# Patient Record
Sex: MALE | Race: WHITE
[De-identification: names, ages, dates, MRNs, and addresses within clinical notes are randomized per-mention and may not be internally consistent; named-entity substitution may affect disease eponyms.]

---

## 2018-05-30 ENCOUNTER — HOSPITAL ENCOUNTER (EMERGENCY)
Dept: HOSPITAL 17 - NEPD | Age: 18
LOS: 1 days | Discharge: HOME | End: 2018-05-31
Payer: COMMERCIAL

## 2018-05-30 VITALS — BODY MASS INDEX: 24.06 KG/M2 | HEIGHT: 68 IN | WEIGHT: 158.73 LBS

## 2018-05-30 VITALS
RESPIRATION RATE: 18 BRPM | OXYGEN SATURATION: 97 % | DIASTOLIC BLOOD PRESSURE: 81 MMHG | TEMPERATURE: 98.6 F | HEART RATE: 136 BPM | SYSTOLIC BLOOD PRESSURE: 140 MMHG

## 2018-05-30 DIAGNOSIS — F17.290: ICD-10-CM

## 2018-05-30 DIAGNOSIS — F10.14: Primary | ICD-10-CM

## 2018-05-30 DIAGNOSIS — F10.129: ICD-10-CM

## 2018-05-30 DIAGNOSIS — F12.90: ICD-10-CM
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
## 2018-05-30 PROCEDURE — 99283 EMERGENCY DEPT VISIT LOW MDM: CPT

## 2018-05-30 PROCEDURE — 85025 COMPLETE CBC W/AUTO DIFF WBC: CPT

## 2018-05-30 PROCEDURE — 84443 ASSAY THYROID STIM HORMONE: CPT

## 2018-05-30 PROCEDURE — 80053 COMPREHEN METABOLIC PANEL: CPT

## 2018-05-30 PROCEDURE — 80307 DRUG TEST PRSMV CHEM ANLYZR: CPT

## 2018-05-31 VITALS
OXYGEN SATURATION: 99 % | DIASTOLIC BLOOD PRESSURE: 74 MMHG | HEART RATE: 92 BPM | SYSTOLIC BLOOD PRESSURE: 118 MMHG | TEMPERATURE: 98.6 F | RESPIRATION RATE: 18 BRPM

## 2018-05-31 VITALS — HEART RATE: 87 BPM | OXYGEN SATURATION: 98 % | RESPIRATION RATE: 16 BRPM

## 2018-05-31 LAB
ALBUMIN SERPL-MCNC: 5 GM/DL (ref 3.4–5)
ALP SERPL-CCNC: 86 U/L (ref 45–117)
ALT SERPL-CCNC: 23 U/L (ref 9–52)
APAP SERPL-MCNC: (no result) MCG/ML (ref 10–30)
AST SERPL-CCNC: 30 U/L (ref 15–39)
BASOPHILS # BLD AUTO: 0 TH/MM3 (ref 0–0.2)
BASOPHILS NFR BLD: 0.3 % (ref 0–2)
BILIRUB SERPL-MCNC: 1.7 MG/DL (ref 0.2–1)
BUN SERPL-MCNC: 11 MG/DL (ref 7–18)
CALCIUM SERPL-MCNC: 9.1 MG/DL (ref 8.5–10.1)
CHLORIDE SERPL-SCNC: 108 MEQ/L (ref 98–107)
CREAT SERPL-MCNC: 1.01 MG/DL (ref 0.6–1.3)
EOSINOPHIL # BLD: 0.2 TH/MM3 (ref 0–0.4)
EOSINOPHIL NFR BLD: 2.1 % (ref 0–4)
ERYTHROCYTE [DISTWIDTH] IN BLOOD BY AUTOMATED COUNT: 14 % (ref 11.6–17.2)
GFR SERPLBLD BASED ON 1.73 SQ M-ARVRAT: 95 ML/MIN (ref 89–?)
GLUCOSE SERPL-MCNC: 96 MG/DL (ref 74–106)
HCO3 BLD-SCNC: 23 MEQ/L (ref 21–32)
HCT VFR BLD CALC: 47.8 % (ref 39–51)
HGB BLD-MCNC: 16.4 GM/DL (ref 13–17)
LYMPHOCYTES # BLD AUTO: 2.5 TH/MM3 (ref 1–4.8)
LYMPHOCYTES NFR BLD AUTO: 23 % (ref 9–44)
MCH RBC QN AUTO: 29.5 PG (ref 27–34)
MCHC RBC AUTO-ENTMCNC: 34.2 % (ref 32–36)
MCV RBC AUTO: 86.3 FL (ref 80–100)
MONOCYTE #: 0.8 TH/MM3 (ref 0–0.9)
MONOCYTES NFR BLD: 7.4 % (ref 0–8)
NEUTROPHILS # BLD AUTO: 7.3 TH/MM3 (ref 1.8–7.7)
NEUTROPHILS NFR BLD AUTO: 67.2 % (ref 16–70)
PLATELET # BLD: 228 TH/MM3 (ref 150–450)
PMV BLD AUTO: 8.8 FL (ref 7–11)
PROT SERPL-MCNC: 9.1 GM/DL (ref 6.4–8.2)
RBC # BLD AUTO: 5.54 MIL/MM3 (ref 4.5–5.9)
SODIUM SERPL-SCNC: 143 MEQ/L (ref 136–145)
WBC # BLD AUTO: 10.9 TH/MM3 (ref 4–11)

## 2018-05-31 NOTE — PD
HPI


Chief Complaint:  Psychiatric Symptoms


Time Seen by Provider:  00:05


Travel History


International Travel<30 days:  No


Contact w/Intl Traveler<30days:  No


Traveled to known affect area:  No





History of Present Illness


HPI


Patient is a 17-year-old male presented to the emergency department for 

psychiatric evaluation under Dominguez act.  Per the Dominguez act report patient 

allegedly had a knife to his throat attempting to cut himself after he had a 

verbal altercation with his girlfriend.  Per her report to the  

patient was threatening to cut his throat with a knife.  Patient states he did 

have a knife to his throat but did not cut himself.  Patient states that the 

marks on his neck are from scratches that his girlfriend caused.  Patient 

denies any psychiatric history, he denies any previous suicide attempt.  

Patient has no physical pain at this time.





[facility]


Past Medical History


Medical History:  Denies Significant Hx


Tetanus Vaccination:  Unknown





Past Surgical History


Surgical History:  No Previous Surgery





Social History


Alcohol Use:  No


Tobacco Use:  Yes (E-Cigarette)


Substance Use:  Yes (Marijuana)





Allergies-Medications


(Allergen,Severity, Reaction):  


Coded Allergies:  


     No Known Allergies (Verified  Allergy, Mild, 5/30/18)


Reported Meds & Prescriptions





Reported Meds & Active Scripts


Active


No Active Prescriptions or Reported Medications    








Review of Systems


Except as stated in HPI:  all other systems reviewed are Neg


Skin:  Positive Other (Abrasions)


Psychiatric:  Positive: Suicidal Ideations





Physical Exam


Narrative


GENERAL: Thin, well-developed, alert male.  Presenting in no acute distress.


SKIN: Warm and dry.  Superficial abrasions to neck and right upper arm and left 

forearm.


HEAD: Atraumatic. Normocephalic. 


EYES: Pupils equal and round. No scleral icterus. No injection or drainage. 


ENT: No nasal bleeding or discharge.  Mucous membranes pink and moist.


NECK: Trachea midline. No JVD. 


CARDIOVASCULAR: Regular rate and rhythm.  


RESPIRATORY: No accessory muscle use. Clear to auscultation. Breath sounds 

equal bilaterally. 


GASTROINTESTINAL: Abdomen soft, non-tender, nondistended. Hepatic and splenic 

margins not palpable. 


MUSCULOSKELETAL: Extremities without clubbing, cyanosis, or edema. No obvious 

deformities. 


NEUROLOGICAL: Awake and alert. No obvious cranial nerve deficits.  Motor 

grossly within normal limits. Five out of 5 muscle strength in the arms and 

legs.  Normal speech.


PSYCHIATRIC: Appropriate mood and affect; insight and judgment normal.





Data


Data


Last Documented VS





Vital Signs








  Date Time  Temp Pulse Resp B/P (MAP) Pulse Ox O2 Delivery O2 Flow Rate FiO2


 


5/30/18 23:54 98.6 136 18 140/81 (100) 97   








Orders





 Orders


Complete Blood Count With Diff (5/31/18 00:05)


Comprehensive Metabolic Panel (5/31/18 00:05)


Thyroid Stimulating Hormone (5/31/18 00:05)


Psych Screen (5/31/18 00:05)


Drug Screen, Random Urine (5/31/18 00:05)


Alcohol (Ethanol) (5/31/18 00:05)


Salicylates (Aspirin) (5/31/18 00:05)


Tylenol (Acetaminophen) (5/31/18 00:05)





Labs





Laboratory Tests








Test


  5/31/18


00:00


 


White Blood Count 10.9 TH/MM3 


 


Red Blood Count 5.54 MIL/MM3 


 


Hemoglobin 16.4 GM/DL 


 


Hematocrit 47.8 % 


 


Mean Corpuscular Volume 86.3 FL 


 


Mean Corpuscular Hemoglobin 29.5 PG 


 


Mean Corpuscular Hemoglobin


Concent 34.2 % 


 


 


Red Cell Distribution Width 14.0 % 


 


Platelet Count 228 TH/MM3 


 


Mean Platelet Volume 8.8 FL 


 


Neutrophils (%) (Auto) 67.2 % 


 


Lymphocytes (%) (Auto) 23.0 % 


 


Monocytes (%) (Auto) 7.4 % 


 


Eosinophils (%) (Auto) 2.1 % 


 


Basophils (%) (Auto) 0.3 % 


 


Neutrophils # (Auto) 7.3 TH/MM3 


 


Lymphocytes # (Auto) 2.5 TH/MM3 


 


Monocytes # (Auto) 0.8 TH/MM3 


 


Eosinophils # (Auto) 0.2 TH/MM3 


 


Basophils # (Auto) 0.0 TH/MM3 


 


CBC Comment DIFF FINAL 


 


Differential Comment  


 


Salicylates Level


  LESS THAN 1.7


MG/DL











MDM


Medical Decision Making


Medical Screen Exam Complete:  Yes


Emergency Medical Condition:  Yes


Interpretation(s)





Vital Signs








  Date Time  Temp Pulse Resp B/P (MAP) Pulse Ox O2 Delivery O2 Flow Rate FiO2


 


5/30/18 23:54 98.6 136 18 140/81 (100) 97   








Differential Diagnosis


Mood disorder versus substance abuse versus suicidal ideations versus assault 

versus other


Narrative Course


Patient is a 17-year-old male presented to the emergency department under Baker 

act for psychiatric evaluation.  Patient has abrasions to his neck and arm that 

appear more consistent with scratches then cuts from a knife.  He denies any 

suicidal ideations now or previously.  He did admit to holding a knife to his 

throat.  Mental health screening discussed with the patient. Psychiatric screen 

ordered.  Labs reviewed, no acute findings identified.  Patient has a blood 

alcohol level of 95.  Patient is medically clear for psychiatric evaluation





Diagnosis





 Primary Impression:  


 Medical clearance for psychiatric admission


 Additional Impression:  


 Alcohol intoxication


 Qualified Codes:  F10.920 - Alcohol use, unspecified with intoxication, 

uncomplicated


Scripts


No Active Prescriptions or Reported Meds


Condition:  Layla Mirza May 31, 2018 01:10

## 2018-05-31 NOTE — PD.PSY.CON
Provisional Diagnosis


Admission Date





Axis I.


Alcohol-induced mood disorder, alcohol use disorder


Axis II.


Deferred


Axis III.


No significant medical history





History of Present Illness


Service


Psychiatry


Consult Requested By


ER


Reason for Consult


Suicidal statement


Primary Care Physician


No Primary Care Physician


HPI


The patient was seen this morning at 8 AM





The patient is a 17-year-old  man, domiciled with girlfriend in 

[place], employed, without no previous psychiatric history, no previous 

suicidal attempts, no previous hospitalizations, alcohol use disorder, no 

significant medical history, who presented to the emergency department for 

psychiatric evaluation under Dominguez act.  Per the Dominguez act report patient 

allegedly had a knife to his throat attempting to cut himself after he had a 

verbal altercation with his girlfriend.  Per her report to the  

patient was threatening to cut his throat with a knife.  Patient states he did 

have a knife to his throat but did not cut himself.  Patient states that the 

marks on his neck are from scratches that his girlfriend caused by scratching.  

Patient denies any psychiatric history, he denies any previous suicide attempt.

  Patient has no physical pain at this time.  He denies suicidal enemas ideation

, he denies visual and auditory hallucinations. collateral information from his 

mother was obtained, his mother says that she is absolutely sure that the 

patient expressed suicidal ideation in the context of frustration and alcohol 

intoxication.  She clarifies that the patient does not have any previous 

psychiatric history, no previous suicide attempts.  She will be very happy to 

come and  the patient in the ER.





Review of Systems


Constitutional:  DENIES: Diaphoretic episodes, Fatigue, Fever, Weight gain, 

Weight loss, Chills, Dizziness, Change in appetite, Night Sweats


Endocrine:  DENIES: Heat/cold intolerance, Polydipsia, Polyuria, Polyphagia


Eyes:  DENIES: Blurred vision, Diplopia, Eye inflammation, Eye pain, Vision loss

, Photosensitivity, Double Vision


Ears, nose, mouth, throat:  DENIES: Tinnitus, Hearing loss, Vertigo, Nasal 

discharge, Oral lesions, Throat pain, Hoarseness, Ear Pain, Running Nose, 

Epistaxis, Sinus Pain, Toothache, Odynophagia


Respiratory:  DENIES: Apneas, Cough, Snoring, Wheezing, Hemoptysis, Sputum 

production, Shortness of breath


Cardiovascular:  DENIES: Chest pain, Palpitations, Syncope, Dyspnea on Exertion

, PND, Lower Extremity Edema, Orthopnea, Claudication


Gastrointestinal:  DENIES: Abdominal pain, Black stools, Bloody stools, 

Constipation, Diarrhea, Nausea, Vomiting, Difficulty Swallowing, Anorexia


Genitourinary:  DENIES: Sexual dysfunction, Urinary frequency, Urinary 

incontinence, Urgency, Hematuria, Dysuria, Nocturia, Penile Discharge, 

Testicular Pain, Testicular Swelling


Musculoskeletal:  DENIES: Joint pain, Muscle aches, Stiffness, Joint Swelling, 

Back pain, Neck pain


Integumentary:  DENIES: Abnormal pigmentation, Nail changes, Pruritus, Rash


Hematologic/lymphatic:  DENIES: Bruising, Lymphadenopathy


Immunologic/allergic:  DENIES: Eczema, Urticaria


Neurologic:  DENIES: Abnormal gait, Headache, Localized weakness, Paresthesias, 

Seizures, Speech Problems, Tremor, Poor Balance


Psychiatric:  DENIES: Anxiety, Confusion, Mood changes, Depression, 

Hallucinations, Agitation, Suicidal Ideation, Homicidal Ideation, Delusions





Past Family Social History


Coded Allergies:  


     No Known Allergies (Verified  Allergy, Mild, 5/30/18)


No Active Prescriptions or Reported Meds


Family Psych History


No family psychiatric history


Social History


Patient was born and raised in [place], he lives in [place] with his 

girlfriend, patient works as an  in a [employer], he has some 

college credit


Patient's Strengths (min. 2)


Family support





Physical Exam


Vital Signs





Vital Signs








  Date Time  Temp Pulse Resp B/P (MAP) Pulse Ox O2 Delivery O2 Flow Rate FiO2


 


5/31/18 10:23        


 


5/31/18 07:50 98.6 92 18  99 Room Air  








Lab Results











Test


  5/31/18


00:00


 


White Blood Count 10.9 TH/MM3 


 


Red Blood Count 5.54 MIL/MM3 


 


Hemoglobin 16.4 GM/DL 


 


Hematocrit 47.8 % 


 


Mean Corpuscular Volume 86.3 FL 


 


Mean Corpuscular Hemoglobin 29.5 PG 


 


Mean Corpuscular Hemoglobin


Concent 34.2 % 


 


 


Red Cell Distribution Width 14.0 % 


 


Platelet Count 228 TH/MM3 


 


Mean Platelet Volume 8.8 FL 


 


Neutrophils (%) (Auto) 67.2 % 


 


Lymphocytes (%) (Auto) 23.0 % 


 


Monocytes (%) (Auto) 7.4 % 


 


Eosinophils (%) (Auto) 2.1 % 


 


Basophils (%) (Auto) 0.3 % 


 


Neutrophils # (Auto) 7.3 TH/MM3 


 


Lymphocytes # (Auto) 2.5 TH/MM3 


 


Monocytes # (Auto) 0.8 TH/MM3 


 


Eosinophils # (Auto) 0.2 TH/MM3 


 


Basophils # (Auto) 0.0 TH/MM3 


 


CBC Comment DIFF FINAL 


 


Differential Comment  


 


Blood Urea Nitrogen 11 MG/DL 


 


Creatinine 1.01 MG/DL 


 


Random Glucose 96 MG/DL 


 


Total Protein 9.1 GM/DL 


 


Albumin 5.0 GM/DL 


 


Calcium Level 9.1 MG/DL 


 


Alkaline Phosphatase 86 U/L 


 


Aspartate Amino Transf


(AST/SGOT) 30 U/L 


 


 


Alanine Aminotransferase


(ALT/SGPT) 23 U/L 


 


 


Total Bilirubin 1.7 MG/DL 


 


Sodium Level 143 MEQ/L 


 


Potassium Level 3.4 MEQ/L 


 


Chloride Level 108 MEQ/L 


 


Carbon Dioxide Level 23.0 MEQ/L 


 


Anion Gap 12 MEQ/L 


 


Estimat Glomerular Filtration


Rate 95 ML/MIN 


 


 


Thyroid Stimulating Hormone


3rd Gen 2.120 uIU/ML 


 


 


Salicylates Level


  LESS THAN 1.7


MG/DL


 


Acetaminophen Level


  LESS THAN 2.0


MCG/ML


 


Ethyl Alcohol Level 95 MG/DL 











Mental Status Examination


Appearance:  Appropriate


Consciousness:  Alert


Orientation:  x4


Motor Activity:  Normal gait


Speech:  Unremarkable


Language:  Adequate


Fund of Knowledge:  Adequate


Attention and Concentration:  Adequate


Memory:  Unremarkable


Mood:  Appropriate


Affect:  Appropriate


Thought Process & Associations:  Intact


Thought Content:  Appropriate


Hallucination Type:  None


Delusion Type:  None


Suicidal Ideation:  No


Suicidal Plan:  No


Suicidal Intention:  No


Homicidal Ideation:  No


Homicidal Plan:  No


Homicidal Intention:  No


Insight:  Adequate


Judgment:  Adequate





Assessment & Plan


Problem List:  


(1) Alcohol abuse with alcohol-induced mood disorder


ICD Codes:  F10.14 - Alcohol abuse with alcohol-induced mood disorder


Assessment & Plan:  On psychiatric evaluation the patient does not present any 

acute neuropsychiatric symptoms or require an immediate psychiatric 

intervention.  The patient now denies suicidal and homicidal ideation, he 

denies visual and auditory hallucinations.  He denies depression, he denies 

anxiety, he denies lula and psychosis.  Apparently his recent suicidal 

statement to his girlfriend was in the context of acute alcohol intoxication 

and anger.  Brief supportive psychotherapy provided.  Ativan 1 mg p.o. for 

symptoms of alcohol withdrawal.  He does not meet criteria for involuntary 

psychiatric admission.  [facility] referral provided to the patient for rehabilitation 

outpatient.





Assessment & Plan


Estimated LOS:  Braulio Salgado MD May 31, 2018 14:00

## 2018-05-31 NOTE — PD
Physical Exam


Time Seen by Provider:  09:42


Narrative


Dr. Linares has evaluated the patient, lifted Dominguez act and cleared the 

patient for discharge.





Data


Data


Last Documented VS





Vital Signs








  Date Time  Temp Pulse Resp B/P (MAP) Pulse Ox O2 Delivery O2 Flow Rate FiO2


 


5/31/18 07:50 98.6 92 18 118/74 (89) 99 Room Air  








Orders





 Orders


Complete Blood Count With Diff (5/31/18 00:05)


Comprehensive Metabolic Panel (5/31/18 00:05)


Thyroid Stimulating Hormone (5/31/18 00:05)


Psych Screen (5/31/18 00:05)


Drug Screen, Random Urine (5/31/18 00:05)


Alcohol (Ethanol) (5/31/18 00:05)


Salicylates (Aspirin) (5/31/18 00:05)


Tylenol (Acetaminophen) (5/31/18 00:05)


Diet Regular Basic (5/31/18 Breakfast)





Labs





Laboratory Tests








Test


  5/31/18


00:00


 


White Blood Count 10.9 TH/MM3 


 


Red Blood Count 5.54 MIL/MM3 


 


Hemoglobin 16.4 GM/DL 


 


Hematocrit 47.8 % 


 


Mean Corpuscular Volume 86.3 FL 


 


Mean Corpuscular Hemoglobin 29.5 PG 


 


Mean Corpuscular Hemoglobin


Concent 34.2 % 


 


 


Red Cell Distribution Width 14.0 % 


 


Platelet Count 228 TH/MM3 


 


Mean Platelet Volume 8.8 FL 


 


Neutrophils (%) (Auto) 67.2 % 


 


Lymphocytes (%) (Auto) 23.0 % 


 


Monocytes (%) (Auto) 7.4 % 


 


Eosinophils (%) (Auto) 2.1 % 


 


Basophils (%) (Auto) 0.3 % 


 


Neutrophils # (Auto) 7.3 TH/MM3 


 


Lymphocytes # (Auto) 2.5 TH/MM3 


 


Monocytes # (Auto) 0.8 TH/MM3 


 


Eosinophils # (Auto) 0.2 TH/MM3 


 


Basophils # (Auto) 0.0 TH/MM3 


 


CBC Comment DIFF FINAL 


 


Differential Comment  


 


Blood Urea Nitrogen 11 MG/DL 


 


Creatinine 1.01 MG/DL 


 


Random Glucose 96 MG/DL 


 


Total Protein 9.1 GM/DL 


 


Albumin 5.0 GM/DL 


 


Calcium Level 9.1 MG/DL 


 


Alkaline Phosphatase 86 U/L 


 


Aspartate Amino Transf


(AST/SGOT) 30 U/L 


 


 


Alanine Aminotransferase


(ALT/SGPT) 23 U/L 


 


 


Total Bilirubin 1.7 MG/DL 


 


Sodium Level 143 MEQ/L 


 


Potassium Level 3.4 MEQ/L 


 


Chloride Level 108 MEQ/L 


 


Carbon Dioxide Level 23.0 MEQ/L 


 


Anion Gap 12 MEQ/L 


 


Estimat Glomerular Filtration


Rate 95 ML/MIN 


 


 


Thyroid Stimulating Hormone


3rd Gen 2.120 uIU/ML 


 


 


Salicylates Level


  LESS THAN 1.7


MG/DL


 


Acetaminophen Level


  LESS THAN 2.0


MCG/ML


 


Ethyl Alcohol Level 95 MG/DL 











MDM


Supervised Visit with GERMAN:  No


Narrative Course


Dr. Linares has evaluated the patient, lifted Angelica santiago and cleared the 

patient for discharge.  The patient's mother is coming to pick him up.  Patient 

contracts safety.  Denies suicidal or homicidal ideations.  Patient will be 

provided community resource packet to /[facility] for follow-up.  Has friends and 

family for support.  Patient was medically cleared by alternate provider prior 

to psych screening.  Patient has been evaluated by psychiatry and and is now 

cleared for discharge.


Diagnosis





 Primary Impression:  


 Alcohol-induced mood disorder


 Additional Impression:  


 Alcohol intoxication


 Qualified Codes:  F10.920 - Alcohol use, unspecified with intoxication, 

uncomplicated


Referrals:  


[facility] (Out patient)





[facility]





Primary Care Physician





Psychiatrist





[facility]


Patient Instructions:  Abuse of Alcohol (ED), Alcohol Dependence (ED), Alcohol 

Intoxication (ED), General Instructions





***Additional Instruction:  


Contract safety to your self and others


Stop drinking alcohol or drink alcohol in moderation


Follow-up with psychiatry


Follow-up with primary care provider


Follow-up with Simeon Delgado


Return to the emergency department immediately with worsening of symptoms


***Med/Other Pt SpecificInfo:  No Change to Meds, No Meds Exist/No RX given


Scripts


No Active Prescriptions or Reported Meds


Disposition:  01 DISCHARGE HOME


Condition:  Stable











Sophy Duncan May 31, 2018 09:43